# Patient Record
Sex: MALE | Race: BLACK OR AFRICAN AMERICAN | Employment: UNEMPLOYED | URBAN - METROPOLITAN AREA
[De-identification: names, ages, dates, MRNs, and addresses within clinical notes are randomized per-mention and may not be internally consistent; named-entity substitution may affect disease eponyms.]

---

## 2017-03-07 ENCOUNTER — HOSPITAL ENCOUNTER (OUTPATIENT)
Dept: OTHER | Age: 57
Discharge: HOME | End: 2017-03-07
Attending: INTERNAL MEDICINE | Admitting: INTERNAL MEDICINE

## 2017-03-07 LAB
ANION GAP SERPL CALCULATED.3IONS-SCNC: 14 MMOL/L (ref 3–16)
BUN BLDV-MCNC: 19 MG/DL (ref 7–20)
CALCIUM SERPL-MCNC: 8.8 MG/DL (ref 8.3–10.6)
CHLORIDE BLD-SCNC: 105 MMOL/L (ref 99–110)
CO2: 21 MMOL/L (ref 21–32)
CREAT SERPL-MCNC: 1.2 MG/DL (ref 0.9–1.3)
CREATININE URINE: 223.7 MG/DL (ref 39–259)
GFR AFRICAN AMERICAN: >60
GFR NON-AFRICAN AMERICAN: >60
GLUCOSE BLD-MCNC: 93 MG/DL (ref 70–99)
HCT VFR BLD CALC: 42.4 % (ref 40.5–52.5)
HEMOGLOBIN: 14.2 G/DL (ref 13.5–17.5)
MCH RBC QN AUTO: 32 PG (ref 26–34)
MCHC RBC AUTO-ENTMCNC: 33.6 G/DL (ref 31–36)
MCV RBC AUTO: 95.3 FL (ref 80–100)
MICROALBUMIN UR-MCNC: 5.3 MG/DL
MICROALBUMIN/CREAT UR-RTO: 23.7 MG/G (ref 0–30)
PARATHYROID HORMONE INTACT: 101.4 PG/ML (ref 14–72)
PDW BLD-RTO: 15 % (ref 12.4–15.4)
PHOSPHORUS: 2.7 MG/DL (ref 2.5–4.9)
PLATELET # BLD: 186 K/UL (ref 135–450)
PMV BLD AUTO: 9.1 FL (ref 5–10.5)
POTASSIUM SERPL-SCNC: 4.6 MMOL/L (ref 3.5–5.1)
RBC # BLD: 4.45 M/UL (ref 4.2–5.9)
SODIUM BLD-SCNC: 140 MMOL/L (ref 136–145)
VITAMIN D 25-HYDROXY: 17.1 NG/ML
WBC # BLD: 5.6 K/UL (ref 4–11)

## 2017-03-29 ENCOUNTER — OFFICE VISIT (OUTPATIENT)
Dept: CARDIOLOGY CLINIC | Age: 57
End: 2017-03-29

## 2017-03-29 VITALS
BODY MASS INDEX: 25.08 KG/M2 | HEART RATE: 60 BPM | HEIGHT: 71 IN | SYSTOLIC BLOOD PRESSURE: 140 MMHG | WEIGHT: 179.12 LBS | OXYGEN SATURATION: 98 % | DIASTOLIC BLOOD PRESSURE: 62 MMHG

## 2017-03-29 DIAGNOSIS — E78.2 MIXED HYPERLIPIDEMIA: ICD-10-CM

## 2017-03-29 DIAGNOSIS — I10 ESSENTIAL HYPERTENSION: ICD-10-CM

## 2017-03-29 DIAGNOSIS — I25.119 CORONARY ARTERY DISEASE INVOLVING NATIVE CORONARY ARTERY OF NATIVE HEART WITH ANGINA PECTORIS (HCC): Primary | ICD-10-CM

## 2017-03-29 PROCEDURE — 99214 OFFICE O/P EST MOD 30 MIN: CPT | Performed by: NURSE PRACTITIONER

## 2017-07-10 RX ORDER — FUROSEMIDE 20 MG/1
TABLET ORAL
Qty: 60 TABLET | Refills: 6 | Status: SHIPPED | OUTPATIENT
Start: 2017-07-10 | End: 2018-03-02 | Stop reason: SDUPTHER

## 2017-07-10 RX ORDER — LISINOPRIL 40 MG/1
TABLET ORAL
Qty: 30 TABLET | Refills: 6 | Status: SHIPPED | OUTPATIENT
Start: 2017-07-10 | End: 2018-03-02 | Stop reason: SDUPTHER

## 2017-07-10 RX ORDER — CLONIDINE HYDROCHLORIDE 0.2 MG/1
TABLET ORAL
Qty: 60 TABLET | Refills: 6 | Status: SHIPPED | OUTPATIENT
Start: 2017-07-10 | End: 2018-03-02 | Stop reason: SDUPTHER

## 2017-07-10 RX ORDER — CARVEDILOL 25 MG/1
TABLET ORAL
Qty: 60 TABLET | Refills: 6 | Status: SHIPPED | OUTPATIENT
Start: 2017-07-10 | End: 2018-01-31 | Stop reason: SDUPTHER

## 2017-07-10 RX ORDER — HYDRALAZINE HYDROCHLORIDE 100 MG/1
TABLET, FILM COATED ORAL
Qty: 90 TABLET | Refills: 6 | Status: SHIPPED | OUTPATIENT
Start: 2017-07-10 | End: 2018-03-02 | Stop reason: SDUPTHER

## 2017-07-10 RX ORDER — ASPIRIN 81 MG
TABLET, DELAYED RELEASE (ENTERIC COATED) ORAL
Qty: 30 TABLET | Refills: 6 | Status: SHIPPED | OUTPATIENT
Start: 2017-07-10 | End: 2018-03-02 | Stop reason: SDUPTHER

## 2017-07-10 RX ORDER — ISOSORBIDE DINITRATE 20 MG/1
TABLET ORAL
Qty: 90 TABLET | Refills: 6 | Status: SHIPPED | OUTPATIENT
Start: 2017-07-10 | End: 2018-03-02 | Stop reason: SDUPTHER

## 2017-09-12 ENCOUNTER — HOSPITAL ENCOUNTER (OUTPATIENT)
Dept: OTHER | Age: 57
Discharge: HOME | End: 2017-09-12
Attending: INTERNAL MEDICINE | Admitting: INTERNAL MEDICINE

## 2017-09-12 LAB
ANION GAP SERPL CALCULATED.3IONS-SCNC: 14 MMOL/L (ref 3–16)
BUN BLDV-MCNC: 23 MG/DL (ref 7–20)
CALCIUM SERPL-MCNC: 9.3 MG/DL (ref 8.3–10.6)
CHLORIDE BLD-SCNC: 98 MMOL/L (ref 99–110)
CO2: 25 MMOL/L (ref 21–32)
CREAT SERPL-MCNC: 1.5 MG/DL (ref 0.9–1.3)
CREATININE URINE: 232.2 MG/DL (ref 39–259)
GFR AFRICAN AMERICAN: 59
GFR NON-AFRICAN AMERICAN: 48
GLUCOSE BLD-MCNC: 91 MG/DL (ref 70–99)
MICROALBUMIN UR-MCNC: 1.7 MG/DL
MICROALBUMIN/CREAT UR-RTO: 7.3 MG/G (ref 0–30)
PHOSPHORUS: 2.3 MG/DL (ref 2.5–4.9)
POTASSIUM SERPL-SCNC: 3.3 MMOL/L (ref 3.5–5.1)
SODIUM BLD-SCNC: 137 MMOL/L (ref 136–145)
VITAMIN D 25-HYDROXY: 28.3 NG/ML

## 2017-10-10 ENCOUNTER — OFFICE VISIT (OUTPATIENT)
Dept: CARDIOLOGY CLINIC | Age: 57
End: 2017-10-10

## 2017-10-10 VITALS
OXYGEN SATURATION: 99 % | HEART RATE: 64 BPM | BODY MASS INDEX: 23.8 KG/M2 | DIASTOLIC BLOOD PRESSURE: 82 MMHG | SYSTOLIC BLOOD PRESSURE: 162 MMHG | HEIGHT: 71 IN | WEIGHT: 170 LBS

## 2017-10-10 DIAGNOSIS — I10 ESSENTIAL HYPERTENSION: ICD-10-CM

## 2017-10-10 DIAGNOSIS — I25.119 CORONARY ARTERY DISEASE INVOLVING NATIVE CORONARY ARTERY OF NATIVE HEART WITH ANGINA PECTORIS (HCC): Primary | ICD-10-CM

## 2017-10-10 DIAGNOSIS — I25.5 ISCHEMIC CARDIOMYOPATHY: ICD-10-CM

## 2017-10-10 PROCEDURE — 99214 OFFICE O/P EST MOD 30 MIN: CPT | Performed by: NURSE PRACTITIONER

## 2017-10-10 NOTE — PATIENT INSTRUCTIONS
Take your medications as prescribed : you have morning, midday and evening doses    appt in 6 months since you see Dr. Shannen Holder next month     Check you BP once a week

## 2017-10-10 NOTE — PROGRESS NOTES
[address]     Outpatient Follow Up Note    Laurence Nettles is 65 y.o. male who presents today with a history of CAD s/p PTCA LAD, CX/OM and RCA April '15 , CM/EF 28-30% up to 40-45% by echo Sept '15, HTN and hyperlpidemia. His other history includes a hospitalization in Feb '16 with acute respiratory failure, pneumonia and decompensated CHF     CHIEF COMPLAINT / HPI:  Follow Up secondary to CAD    Subjective:   He denies significant chest pain. There is no SOB/PEDERSON. The patient denies orthopnea/PND. The patient does not have swelling. The patients weight is 168-170#  The patient is not experiencing palpitations or dizziness. These symptoms are stable since his last OV. With regard to medication therapy the patient has not been compliant with prescribed regimen. They have tolerated therapy to date.      Past Medical History:   Diagnosis Date    Bipolar 1 disorder ([facility] [address]     Hypertension     Schizo affective schizophrenia ([facility]     Smoking     Visit for intubation 3-15/2015    pulmonary edema     Social History:    History   Smoking Status    Current Every Day Smoker    Packs/day: 1.00    Years: 15.00    Types: Cigarettes   Smokeless Tobacco    Never Used     Current Outpatient Prescriptions   Medication Sig Dispense Refill    atorvastatin (LIPITOR) 20 MG tablet TAKE 1 TABLET BY MOUTH ONCE DAILY 30 tablet 5    ASPIRIN LOW DOSE 81 MG EC tablet TAKE 1 TABLET BY MOUTH ONCE DAILY 30 tablet 6    furosemide (LASIX) 20 MG tablet TAKE TWO (2) TABLETS BY MOUTH ONCE DAILY 60 tablet 6    carvedilol (COREG) 25 MG tablet TAKE ONE (1) TABLET BY MOUTH TWICE DAILY WITH MEALS 60 tablet 6    hydrALAZINE (APRESOLINE) 100 MG tablet TAKE (1) TABLET BY MOUTH EVERY 8 HOURS 90 tablet 6    cloNIDine (CATAPRES) 0.2 MG tablet TAKE ONE (1) TABLET BY MOUTH TWICE DAILY 60 tablet 6    isosorbide dinitrate (ISORDIL) 20 MG tablet TAKE ONE (1) TABLET BY MOUTH THREE TIMES DAILY 90 tablet 6    lisinopril (PRINIVIL;ZESTRIL) 40 MG tablet TAKE 1 TABLET BY MOUTH ONCE DAILY 30 tablet 6    ARIPiprazole (ABILIFY) 15 MG tablet Take 15 mg by mouth daily. No current facility-administered medications for this visit. REVIEW OF SYSTEMS:    CONSTITUTIONAL: no major weight change; - fatigue, weakness, night sweats or fever. HEENT: No new ringing in the ears or visual disturbances . RESPIRATORY: No new SOB, PND, orthopnea or cough. CARDIOVASCULAR: See HPI  GI: No nausea, vomiting, diarrhea, constipation, abdominal pain or changes in bowel habits. : No urinary frequency, urgency, incontinence hematuria or dysuria. SKIN: No cyanosis or skin lesions. MUSCULOSKELETAL: No new muscle or joint pain. NEUROLOGICAL: No syncope or TIA-like symptoms. PSYCHIATRIC: No anxiety, pain, insomnia or depression    Objective:   PHYSICAL EXAM:        Vitals:    10/10/17 1115 10/10/17 1130 10/10/17 1150   BP: (!) 160/98 (!) 160/96 (!) 162/82   Site: Left Arm  Right Arm   Position: Sitting     Cuff Size: Medium Adult  Medium Adult   Pulse: 64     SpO2: 99%     Weight: 170 lb (77.1 kg)     Height: 5' 11\" (1.803 m)        BMI 24.98 kg/m2     CONSTITUTIONAL: Cooperative, no apparent distress, and appears well nourished / developed  NEUROLOGIC:  Awake and orientated to person, place and time. PSYCH: Calm affect. SKIN: Warm and dry. HEENT: Sclera non-icteric, normocephalic, neck supple, no elevation of JVP, normal carotid pulses with no bruits and thyroid normal size. LUNGS:  No increased work of breathing and clear to auscultation, no crackles or wheezing  CARDIOVASCULAR:  Regular rate 60 with ectopy and rhythm with + murmur 5th ICS Lt MCL, gallops, rubs, or abnormal heart sounds, normal PMI. The apical impulses not displaced  JVP less than 8 cm H2O  Heart tones are crisp and normal  Cervical veins are not engorged  The carotid upstroke is normal in amplitude and contour without delay or bruit  JVP is not elevated  ABDOMEN: Normal bowel sounds, non-distended and non-tender to palpation  EXT: No edema, no calf tenderness. Pulses are present bilaterally. DATA:    Lab Results   Component Value Date    CREATININE 1.5 (H) 09/12/2017    BUN 23 (H) 09/12/2017     09/12/2017    K 3.3 (L) 09/12/2017    CL 98 (L) 09/12/2017    CO2 25 09/12/2017     No results found for: TSH, W3ITFNA, I8IRQAA, THYROIDAB  Lab Results   Component Value Date    WBC 5.6 03/07/2017    HGB 14.2 03/07/2017    HCT 42.4 03/07/2017    MCV 95.3 03/07/2017     03/07/2017     Radiology Review:  Pertinent images / reports were reviewed as a part of this visit and reveals the following:    Echo: 2/17/16:  Summary  -Left ventricle size is normal.  -Moderate-severe concentric left ventricular hypertrophy is present.  -Left ventricular function is reduced with ejection fraction estimated at 40-45 %. -There is hypokinesis of the inferior wall.  -There is reversal of E/A inflow velocities across the mitral valve suggesting impaired left ventricular relaxation.  -There is trivial mitral and mild pulmonic and tricuspid regurgitation with RVSP estimated at 35 mmHg. -Bilateral atria enlargement.  -There is a right pleural effusion. Last Angiogram: 9/14/2015 : mid-CX 40%; OM-1 patent stent; mid-RCA 50% instent stenosis      Angiogram: 4/27/15: [facility] SUMMARY  ~LM     normal  ~LAD   Mid 80% (FFR 0.78)  ~Cx     40% mid, 80-90% OM1  ~RCA  90% mid  ~LVG   Not performed    Impression  ~Angiography w/ severe MVD  ~LVEDP 6  ~LVG not performed    Intervention  ~PCI of LAD with 3.5X33 Alpine AKUA  ~PCI of Cx with 2.5X12 Alpine AKUA  ~PCI of RCA with 3.0X38 Alpine AKUA (distal 1/2 postdilated with 3.5 NC and proximal 1/2 postdilated with 4. [facility])         Assessment:     1. Coronary artery disease involving native coronary artery of native heart without angina pectoris   ~stable: denies recurrence of angina  ~instent stenosis 50% RCA by cath  ~ASA / statin / BB     2.  Essential hypertension, benign   ~elevated : has not been taking medications as prescribed. Takes evening meds only   ~medication compliance was better when first started with  [address]  ~no longer permits his mother to take his BP ; states he is hooper    3. Cardiomyopathy ([address]   ~stable  ~neg for decompensation despite BP and med compliance   ~global HK, EF 40-45% by echo  ~BB / hydralazine-isosorbide combination / lisinopril. I had the opportunity to review the clinical symptoms and presentation of Misa Rodriguez. Plan:     1. Encouraged to take meds as prescribed   ~he is agreeable to having his BP checked weekly  2. F/U in 6 months since he sees NE in one month    ~we will call for his last lab results    ~plan to recheck lipid profile/LFTs    Overall the patient is stable from CV standpoint    I have addresed the patient's cardiac risk factors and adjusted pharmacologic treatment as needed. In addition, I have reinforced the need for patient directed risk factor modification. Further evaluation will be based upon the patient's clinical course and testing results. All questions and concerns were addressed to the patient/mother. Alternatives to my treatment were discussed. The patient is currently smoking : ~ 2 pks a month. The risks related to smoking were reviewed with the patient. Recommend maintaining a smoke-free lifestyle. Products available for smoking cessation were discussed . Patient is on a beta-blocker  Patient is on an ace-i & isordil/hydralazine combination; CKD stage III, baseline creatinine 1.2-1.4,  followed by Dr. Mary Lobo    Patient is on a statin    Antiplatelet therapy  has been recommended / prescribed for this patient. Education conducted on adverse reactions including bleeding was discussed. Plavix does not appear on med    Daily weight, low sodium diet were discussed.  Patient instructed to call the office with a weight gain: > 3 # over night or 5# in one week; swelling, SOB/orthopnea/PND    The patient verbalizes understanding not to stop medications without discussing with us. Discussed exercise: 30-60 minutes 7 days/week. Walks daily for 15 minutes   Discussed Low saturated fat/CHARLES diet. Thank you for allowing to us to participate in the care of Marcelino Montana.

## 2017-10-10 NOTE — COMMUNICATION BODY
SpO2: 99%     Weight: 170 lb (77.1 kg)     Height: 5' 11\" (1.803 m)        BMI 24.98 kg/m2     CONSTITUTIONAL: Cooperative, no apparent distress, and appears well nourished / developed  NEUROLOGIC:  Awake and orientated to person, place and time. PSYCH: Calm affect. SKIN: Warm and dry. HEENT: Sclera non-icteric, normocephalic, neck supple, no elevation of JVP, normal carotid pulses with no bruits and thyroid normal size. LUNGS:  No increased work of breathing and clear to auscultation, no crackles or wheezing  CARDIOVASCULAR:  Regular rate 60 with ectopy and rhythm with + murmur 5th ICS Lt MCL, gallops, rubs, or abnormal heart sounds, normal PMI. The apical impulses not displaced  JVP less than 8 cm H2O  Heart tones are crisp and normal  Cervical veins are not engorged  The carotid upstroke is normal in amplitude and contour without delay or bruit  JVP is not elevated  ABDOMEN:  Normal bowel sounds, non-distended and non-tender to palpation  EXT: No edema, no calf tenderness. Pulses are present bilaterally. DATA:    Lab Results   Component Value Date    CREATININE 1.5 (H) 09/12/2017    BUN 23 (H) 09/12/2017     09/12/2017    K 3.3 (L) 09/12/2017    CL 98 (L) 09/12/2017    CO2 25 09/12/2017     No results found for: TSH, Y0IQZOI, S3BNRJC, THYROIDAB  Lab Results   Component Value Date    WBC 5.6 03/07/2017    HGB 14.2 03/07/2017    HCT 42.4 03/07/2017    MCV 95.3 03/07/2017     03/07/2017     Radiology Review:  Pertinent images / reports were reviewed as a part of this visit and reveals the following:    Echo: 2/17/16:  Summary  -Left ventricle size is normal.  -Moderate-severe concentric left ventricular hypertrophy is present.  -Left ventricular function is reduced with ejection fraction estimated at 40-45 %.   -There is hypokinesis of the inferior wall.  -There is reversal of E/A inflow velocities across the mitral valve suggesting impaired left ventricular relaxation.  -There is trivial mitral and mild pulmonic and tricuspid regurgitation with RVSP estimated at 35 mmHg. -Bilateral atria enlargement.  -There is a right pleural effusion. Last Angiogram: 9/14/2015 : mid-CX 40%; OM-1 patent stent; mid-RCA 50% instent stenosis      Angiogram: 4/27/15: [facility] SUMMARY  ~LM     normal  ~LAD   Mid 80% (FFR 0.78)  ~Cx     40% mid, 80-90% OM1  ~RCA  90% mid  ~LVG   Not performed    Impression  ~Angiography w/ severe MVD  ~LVEDP 6  ~LVG not performed    Intervention  ~PCI of LAD with 3.5X33 Alpine AKUA  ~PCI of Cx with 2.5X12 Alpine AKUA  ~PCI of RCA with 3.0X38 Alpine AKUA (distal 1/2 postdilated with 3.5 NC and proximal 1/2 postdilated with 4. [facility])         Assessment:     1. Coronary artery disease involving native coronary artery of native heart without angina pectoris   ~stable: denies recurrence of angina  ~instent stenosis 50% RCA by cath  ~ASA / statin / BB     2. Essential hypertension, benign   ~elevated : has not been taking medications as prescribed. Takes evening meds only   ~medication compliance was better when first started with  [address]  ~no longer permits his mother to take his BP ; states he is hooper    3. Cardiomyopathy ([address]   ~stable  ~neg for decompensation despite BP and med compliance   ~global HK, EF 40-45% by echo  ~BB / hydralazine-isosorbide combination / lisinopril. I had the opportunity to review the clinical symptoms and presentation of Yessenia Norton. Plan:     1. Encouraged to take meds as prescribed   ~he is agreeable to having his BP checked weekly  2. F/U in 6 months since he sees NE in one month    ~we will call for his last lab results    ~plan to recheck lipid profile/LFTs    Overall the patient is stable from CV standpoint    I have addresed the patient's cardiac risk factors and adjusted pharmacologic treatment as needed. In addition, I have reinforced the need for patient directed risk factor modification.     Further evaluation will be based upon the patient's clinical course and testing results. All questions and concerns were addressed to the patient/mother. Alternatives to my treatment were discussed. The patient is currently smoking : ~ 2 pks a month. The risks related to smoking were reviewed with the patient. Recommend maintaining a smoke-free lifestyle. Products available for smoking cessation were discussed . Patient is on a beta-blocker  Patient is on an ace-i & isordil/hydralazine combination; CKD stage III, baseline creatinine 1.2-1.4,  followed by Dr. Chelsie Fox    Patient is on a statin    Antiplatelet therapy  has been recommended / prescribed for this patient. Education conducted on adverse reactions including bleeding was discussed. Plavix does not appear on med    Daily weight, low sodium diet were discussed. Patient instructed to call the office with a weight gain: > 3 # over night or 5# in one week; swelling, SOB/orthopnea/PND    The patient verbalizes understanding not to stop medications without discussing with us. Discussed exercise: 30-60 minutes 7 days/week. Walks daily for 15 minutes   Discussed Low saturated fat/CHARLES diet. Thank you for allowing to us to participate in the care of Merlin Seats.

## 2017-10-10 NOTE — LETTER
 lisinopril (PRINIVIL;ZESTRIL) 40 MG tablet TAKE 1 TABLET BY MOUTH ONCE DAILY 30 tablet 6    ARIPiprazole (ABILIFY) 15 MG tablet Take 15 mg by mouth daily. Objective:   PHYSICAL EXAM:        Vitals:    10/10/17 1115 10/10/17 1130 10/10/17 1150   BP: (!) 160/98 (!) 160/96 (!) 162/82   Site: Left Arm  Right Arm   Position: Sitting     Cuff Size: Medium Adult  Medium Adult   Pulse: 64     SpO2: 99%     Weight: 170 lb (77.1 kg)     Height: 5' 11\" (1.803 m)        BMI 24.98 kg/m2     CONSTITUTIONAL: Cooperative, no apparent distress, and appears well nourished / developed  NEUROLOGIC:  Awake and orientated to person, place and time. PSYCH: Calm affect. SKIN: Warm and dry. HEENT: Sclera non-icteric, normocephalic, neck supple, no elevation of JVP, normal carotid pulses with no bruits and thyroid normal size. LUNGS:  No increased work of breathing and clear to auscultation, no crackles or wheezing  CARDIOVASCULAR:  Regular rate 60 with ectopy and rhythm with + murmur 5th ICS Lt MCL, gallops, rubs, or abnormal heart sounds, normal PMI. The apical impulses not displaced  JVP less than 8 cm H2O  Heart tones are crisp and normal  Cervical veins are not engorged  The carotid upstroke is normal in amplitude and contour without delay or bruit  JVP is not elevated  ABDOMEN:  Normal bowel sounds, non-distended and non-tender to palpation  EXT: No edema, no calf tenderness. Pulses are present bilaterally.     DATA:    Lab Results   Component Value Date    CREATININE 1.5 (H) 09/12/2017    BUN 23 (H) 09/12/2017     09/12/2017    K 3.3 (L) 09/12/2017    CL 98 (L) 09/12/2017    CO2 25 09/12/2017     No results found for: TSH, Q5LHHAW, N9EIKCF, THYROIDAB  Lab Results   Component Value Date    WBC 5.6 03/07/2017    HGB 14.2 03/07/2017    HCT 42.4 03/07/2017    MCV 95.3 03/07/2017     03/07/2017     Radiology Review:  Pertinent images / reports were reviewed as a part of this visit and reveals the following: Echo: 2/17/16:  Summary  -Left ventricle size is normal.  -Moderate-severe concentric left ventricular hypertrophy is present.  -Left ventricular function is reduced with ejection fraction estimated at 40-45 %. -There is hypokinesis of the inferior wall.  -There is reversal of E/A inflow velocities across the mitral valve suggesting impaired left ventricular relaxation.  -There is trivial mitral and mild pulmonic and tricuspid regurgitation with RVSP estimated at 35 mmHg. -Bilateral atria enlargement.  -There is a right pleural effusion. Last Angiogram: 9/14/2015 : mid-CX 40%; OM-1 patent stent; mid-RCA 50% instent stenosis      Angiogram: 4/27/15: [facility] SUMMARY  ~LM     normal  ~LAD   Mid 80% (FFR 0.78)  ~Cx     40% mid, 80-90% OM1  ~RCA  90% mid  ~LVG   Not performed    Impression  ~Angiography w/ severe MVD  ~LVEDP 6  ~LVG not performed    Intervention  ~PCI of LAD with 3.5X33 Alpine AKUA  ~PCI of Cx with 2.5X12 Alpine AKUA  ~PCI of RCA with 3.0X38 Alpine AKUA (distal 1/2 postdilated with 3.5 NC and proximal 1/2 postdilated with 4. [facility])         Assessment:     1. Coronary artery disease involving native coronary artery of native heart without angina pectoris   ~stable: denies recurrence of angina  ~instent stenosis 50% RCA by cath  ~ASA / statin / BB     2. Essential hypertension, benign   ~elevated : has not been taking medications as prescribed. Takes evening meds only   ~medication compliance was better when first started with  [address]  ~no longer permits his mother to take his BP ; states he is hooper    3. Cardiomyopathy ([address]   ~stable  ~neg for decompensation despite BP and med compliance   ~global HK, EF 40-45% by echo  ~BB / hydralazine-isosorbide combination / lisinopril. I had the opportunity to review the clinical symptoms and presentation of Lazaro Gardner. Plan:     1.  Encouraged to take meds as prescribed   ~he is agreeable to having his BP checked weekly 2. F/U in 6 months since he sees NE in one month    ~we will call for his last lab results    ~plan to recheck lipid profile/LFTs    Overall the patient is stable from CV standpoint    I have addresed the patient's cardiac risk factors and adjusted pharmacologic treatment as needed. In addition, I have reinforced the need for patient directed risk factor modification. Further evaluation will be based upon the patient's clinical course and testing results. All questions and concerns were addressed to the patient/mother. Alternatives to my treatment were discussed. The patient is currently smoking : ~ 2 pks a month. The risks related to smoking were reviewed with the patient. Recommend maintaining a smoke-free lifestyle. Products available for smoking cessation were discussed . Patient is on a beta-blocker  Patient is on an ace-i & isordil/hydralazine combination; CKD stage III, baseline creatinine 1.2-1.4,  followed by Dr. Job De Leon    Patient is on a statin    Antiplatelet therapy  has been recommended / prescribed for this patient. Education conducted on adverse reactions including bleeding was discussed. Plavix does not appear on med    Daily weight, low sodium diet were discussed. Patient instructed to call the office with a weight gain: > 3 # over night or 5# in one week; swelling, SOB/orthopnea/PND    The patient verbalizes understanding not to stop medications without discussing with us. Discussed exercise: 30-60 minutes 7 days/week. Walks daily for 15 minutes   Discussed Low saturated fat/CHARLES diet. Thank you for allowing to us to participate in the care of Corona Cortés. If you have questions, please do not hesitate to call me. I look forward to following Nataly Armstrong along with you.     Sincerely,        Tori Bennett NP

## 2017-10-31 ENCOUNTER — HOSPITAL ENCOUNTER (OUTPATIENT)
Dept: OTHER | Age: 57
Discharge: HOME | End: 2017-10-31
Attending: NURSE PRACTITIONER | Admitting: NURSE PRACTITIONER

## 2017-10-31 LAB
ALBUMIN SERPL-MCNC: 3.8 G/DL (ref 3.4–5)
ALP BLD-CCNC: 82 U/L (ref 40–129)
ALT SERPL-CCNC: 28 U/L (ref 10–40)
AST SERPL-CCNC: 17 U/L (ref 15–37)
BILIRUB SERPL-MCNC: 0.3 MG/DL (ref 0–1)
BILIRUBIN DIRECT: <0.2 MG/DL (ref 0–0.3)
BILIRUBIN, INDIRECT: NORMAL MG/DL (ref 0–1)
CHOLESTEROL, TOTAL: 114 MG/DL (ref 0–199)
HDLC SERPL-MCNC: 39 MG/DL (ref 40–60)
LDL CHOLESTEROL CALCULATED: 60 MG/DL
TOTAL PROTEIN: 7.2 G/DL (ref 6.4–8.2)
TRIGL SERPL-MCNC: 77 MG/DL (ref 0–150)
VLDLC SERPL CALC-MCNC: 15 MG/DL

## 2017-11-01 ENCOUNTER — TELEPHONE (OUTPATIENT)
Dept: CARDIOLOGY CLINIC | Age: 57
End: 2017-11-01

## 2018-01-31 RX ORDER — CARVEDILOL 25 MG/1
TABLET ORAL
Qty: 60 TABLET | Refills: 11 | Status: SHIPPED | OUTPATIENT
Start: 2018-01-31

## 2018-03-05 RX ORDER — CLONIDINE HYDROCHLORIDE 0.2 MG/1
TABLET ORAL
Qty: 60 TABLET | Refills: 6 | OUTPATIENT
Start: 2018-03-05

## 2018-03-05 RX ORDER — ATORVASTATIN CALCIUM 20 MG/1
TABLET, FILM COATED ORAL
Qty: 30 TABLET | Refills: 5 | OUTPATIENT
Start: 2018-03-05

## 2018-03-05 RX ORDER — ISOSORBIDE DINITRATE 20 MG/1
TABLET ORAL
Qty: 90 TABLET | Refills: 6 | OUTPATIENT
Start: 2018-03-05

## 2018-03-05 RX ORDER — HYDRALAZINE HYDROCHLORIDE 100 MG/1
TABLET, FILM COATED ORAL
Qty: 90 TABLET | Refills: 6 | OUTPATIENT
Start: 2018-03-05

## 2018-03-05 RX ORDER — ASPIRIN 81 MG/1
TABLET ORAL
Qty: 30 TABLET | Refills: 6 | OUTPATIENT
Start: 2018-03-05

## 2018-03-05 RX ORDER — FUROSEMIDE 20 MG/1
TABLET ORAL
Qty: 60 TABLET | Refills: 6 | OUTPATIENT
Start: 2018-03-05

## 2018-03-05 RX ORDER — LISINOPRIL 40 MG/1
TABLET ORAL
Qty: 30 TABLET | Refills: 6 | OUTPATIENT
Start: 2018-03-05

## 2018-03-12 ENCOUNTER — TELEPHONE (OUTPATIENT)
Dept: CARDIOLOGY CLINIC | Age: 58
End: 2018-03-12